# Patient Record
Sex: FEMALE | Race: BLACK OR AFRICAN AMERICAN | NOT HISPANIC OR LATINO | URBAN - METROPOLITAN AREA
[De-identification: names, ages, dates, MRNs, and addresses within clinical notes are randomized per-mention and may not be internally consistent; named-entity substitution may affect disease eponyms.]

---

## 2019-12-07 ENCOUNTER — EMERGENCY (EMERGENCY)
Facility: HOSPITAL | Age: 34
LOS: 0 days | Discharge: HOME | End: 2019-12-07
Attending: EMERGENCY MEDICINE
Payer: COMMERCIAL

## 2019-12-07 VITALS
HEIGHT: 70 IN | OXYGEN SATURATION: 100 % | DIASTOLIC BLOOD PRESSURE: 82 MMHG | TEMPERATURE: 98 F | RESPIRATION RATE: 17 BRPM | WEIGHT: 154.1 LBS | HEART RATE: 98 BPM | SYSTOLIC BLOOD PRESSURE: 144 MMHG

## 2019-12-07 VITALS — RESPIRATION RATE: 18 BRPM | OXYGEN SATURATION: 100 % | HEART RATE: 90 BPM
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
## 2019-12-07 DIAGNOSIS — T20.17XA BURN OF FIRST DEGREE OF NECK, INITIAL ENCOUNTER: ICD-10-CM
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
## 2019-12-07 PROCEDURE — 99283 EMERGENCY DEPT VISIT LOW MDM: CPT | Mod: 25

## 2019-12-07 PROCEDURE — 16000 INITIAL TREATMENT OF BURN(S): CPT

## 2019-12-07 RX ORDER — IBUPROFEN 200 MG
600 TABLET ORAL ONCE
Refills: 0 | Status: COMPLETED | OUTPATIENT
Start: 2019-12-07 | End: 2019-12-07

## 2019-12-07 RX ADMIN — Medication 1 APPLICATION(S): at 22:23

## 2019-12-07 RX ADMIN — Medication 600 MILLIGRAM(S): at 22:22
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
## 2019-12-07 NOTE — ED PROVIDER NOTE - PATIENT PORTAL LINK FT
You can access the FollowMyHealth Patient Portal offered by [facility] by registering at the following website: [email or web address]. By joining [employer]’s FollowMyHealth portal, you will also be able to view your health information using other applications (apps) compatible with our system.

## 2019-12-07 NOTE — ED ADULT NURSE NOTE - NSIMPLEMENTINTERV_GEN_ALL_ED
Implemented All Universal Safety Interventions:  [place] to call system. Call bell, personal items and telephone within reach. Instruct patient to call for assistance. Room bathroom lighting operational. Non-slip footwear when patient is off stretcher. Physically safe environment: no spills, clutter or unnecessary equipment. Stretcher in lowest position, wheels locked, appropriate side rails in place.
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
## 2022-09-08 ENCOUNTER — EMERGENCY (EMERGENCY)
Facility: HOSPITAL | Age: 37
LOS: 0 days | Discharge: HOME | End: 2022-09-09
Attending: STUDENT IN AN ORGANIZED HEALTH CARE EDUCATION/TRAINING PROGRAM
Payer: COMMERCIAL

## 2022-09-08 DIAGNOSIS — T14.8XXA OTHER INJURY OF UNSPECIFIED BODY REGION, INITIAL ENCOUNTER: ICD-10-CM

## 2022-09-08 DIAGNOSIS — R22.0 LOCALIZED SWELLING, MASS AND LUMP, HEAD: ICD-10-CM
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
## 2022-09-08 DIAGNOSIS — M25.532 PAIN IN LEFT WRIST: ICD-10-CM

## 2022-09-08 DIAGNOSIS — Z23 ENCOUNTER FOR IMMUNIZATION: ICD-10-CM
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
## 2022-09-08 PROCEDURE — 99284 EMERGENCY DEPT VISIT MOD MDM: CPT

## 2022-09-09 VITALS
SYSTOLIC BLOOD PRESSURE: 127 MMHG | TEMPERATURE: 98 F | OXYGEN SATURATION: 100 % | HEART RATE: 99 BPM | WEIGHT: 151.9 LBS | DIASTOLIC BLOOD PRESSURE: 82 MMHG | RESPIRATION RATE: 17 BRPM | HEIGHT: 70 IN

## 2022-09-09 VITALS
OXYGEN SATURATION: 98 % | HEART RATE: 90 BPM | TEMPERATURE: 98 F | DIASTOLIC BLOOD PRESSURE: 90 MMHG | SYSTOLIC BLOOD PRESSURE: 134 MMHG | RESPIRATION RATE: 16 BRPM

## 2022-09-09 PROBLEM — Z78.9 OTHER SPECIFIED HEALTH STATUS: Chronic | Status: ACTIVE | Noted: 2019-12-07

## 2022-09-09 PROCEDURE — 73110 X-RAY EXAM OF WRIST: CPT | Mod: 26,LT

## 2022-09-09 PROCEDURE — 73090 X-RAY EXAM OF FOREARM: CPT | Mod: 26,59,LT

## 2022-09-09 RX ORDER — IBUPROFEN 200 MG
400 TABLET ORAL ONCE
Refills: 0 | Status: COMPLETED | OUTPATIENT
Start: 2022-09-09 | End: 2022-09-09

## 2022-09-09 RX ORDER — ACETAMINOPHEN 500 MG
650 TABLET ORAL ONCE
Refills: 0 | Status: COMPLETED | OUTPATIENT
Start: 2022-09-09 | End: 2022-09-09

## 2022-09-09 RX ORDER — TETANUS TOXOID, REDUCED DIPHTHERIA TOXOID AND ACELLULAR PERTUSSIS VACCINE, ADSORBED 5; 2.5; 8; 8; 2.5 [IU]/.5ML; [IU]/.5ML; UG/.5ML; UG/.5ML; UG/.5ML
0.5 SUSPENSION INTRAMUSCULAR ONCE
Refills: 0 | Status: COMPLETED | OUTPATIENT
Start: 2022-09-09 | End: 2022-09-09

## 2022-09-09 RX ADMIN — TETANUS TOXOID, REDUCED DIPHTHERIA TOXOID AND ACELLULAR PERTUSSIS VACCINE, ADSORBED 0.5 MILLILITER(S): 5; 2.5; 8; 8; 2.5 SUSPENSION INTRAMUSCULAR at 03:25

## 2022-09-09 RX ADMIN — Medication 650 MILLIGRAM(S): at 03:24

## 2022-09-09 RX ADMIN — Medication 400 MILLIGRAM(S): at 03:24

## 2022-09-09 NOTE — ED PROVIDER NOTE - PATIENT PORTAL LINK FT
You can access the FollowMyHealth Patient Portal offered by [facility] by registering at the following website: [email or web address]. By joining [employer]’s FollowMyHealth portal, you will also be able to view your health information using other applications (apps) compatible with our system.
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
## 2023-11-20 NOTE — ED ADULT TRIAGE NOTE - PAIN RATING/NUMBER SCALE (0-10): ACTIVITY
7/18/2023        Sera Cook  [address]  [facility] [address]      Sera Cook,    Our records show that Sera may be due for one or more doses of the vaccines recommended by the Centers for Disease Control and Prevention. According to our records, Sera is due for the following preventive immunization(s):     Health Maintenance Due   Topic Date Due    COVID-19 Vaccine (1) Never done    Varicella Vaccine (1 of 2 - 2-dose childhood series) Never done    Pneumococcal Vaccine 0-64 (2 - PCV) 08/24/2012    Cervical Cancer Screen 30-64 -  08/21/2017       If Sera has had the vaccines listed above, please notify our office so we can update our records. If Sera has not had any of the vaccines listed above, please contact our office at Dept: [phone] to schedule an appointment, or you may schedule using the [employer] ann marie.     Sincerely,       Sherry Garcia DO   [address] [facility] [address]       [facility] offers online access to your health information via [email or web address]  
7
No